# Patient Record
Sex: MALE | Race: BLACK OR AFRICAN AMERICAN | NOT HISPANIC OR LATINO | URBAN - METROPOLITAN AREA
[De-identification: names, ages, dates, MRNs, and addresses within clinical notes are randomized per-mention and may not be internally consistent; named-entity substitution may affect disease eponyms.]

---

## 2020-03-04 ENCOUNTER — EMERGENCY (EMERGENCY)
Facility: HOSPITAL | Age: 25
LOS: 1 days | Discharge: HOME | End: 2020-03-04
Attending: EMERGENCY MEDICINE | Admitting: EMERGENCY MEDICINE
Payer: SELF-PAY

## 2020-03-04 VITALS
WEIGHT: 210.1 LBS | SYSTOLIC BLOOD PRESSURE: 147 MMHG | RESPIRATION RATE: 18 BRPM | DIASTOLIC BLOOD PRESSURE: 77 MMHG | TEMPERATURE: 98 F | HEIGHT: 75 IN | OXYGEN SATURATION: 97 % | HEART RATE: 72 BPM

## 2020-03-04 PROCEDURE — 99284 EMERGENCY DEPT VISIT MOD MDM: CPT

## 2020-03-04 RX ORDER — GENTAMICIN SULFATE 40 MG/ML
240 VIAL (ML) INJECTION ONCE
Refills: 0 | Status: COMPLETED | OUTPATIENT
Start: 2020-03-04 | End: 2020-03-04

## 2020-03-04 RX ORDER — AZITHROMYCIN 500 MG/1
2000 TABLET, FILM COATED ORAL ONCE
Refills: 0 | Status: COMPLETED | OUTPATIENT
Start: 2020-03-04 | End: 2020-03-04

## 2020-03-04 RX ADMIN — Medication 80 MILLIGRAM(S): at 19:47

## 2020-03-04 RX ADMIN — AZITHROMYCIN 2000 MILLIGRAM(S): 500 TABLET, FILM COATED ORAL at 19:47

## 2020-03-04 NOTE — ED PROVIDER NOTE - PHYSICAL EXAMINATION
GEN: Well appearing, well nourished, awake, alert, oriented to person, place, time/situation and in no apparent distress.  HENMT: Airway patent, neck supple. No stridor.  EYES: No pallor or scleral icterus.  CARDIAC: Normal rate, regular rhythm.  Heart sounds S1, S2.  No murmurs, rubs or gallops.   RESP: Breath sounds clear and equal bilaterally. Normal work of breathing. No respiratory distress.   GI: Abdomen soft, non-tender, no rebound or guarding. Bowel sounds present. No CVAT.   : no testicular ttp, uncircumsized, no penile ttp but +thick discharge. No rash, no femoral LAD.   BACK: No midline spinal ttp  MSK: FROM, no obvious deformities, no pedal edema  NEURO: Alert and oriented x3, MAEx4 purposefully, follows commands appropriately, normal gait   PSYCH: Calm and cooperative

## 2020-03-04 NOTE — ED PROVIDER NOTE - NS ED ROS FT
GEN: no f/c, no malaise  HEENT: no nasal congestion, no sore throat   CV: no chest pain, no palpitations  RESP: no cough, no SOB  GI: no abd pain, no nausea, no vomiting, no diarrhea  : no dysuria or frequency, +penile discharge, no rash  MSK: no back pain  NEURO: no headache

## 2020-03-04 NOTE — ED PROVIDER NOTE - OBJECTIVE STATEMENT
24yoM o/w healthy, here c/o penile discharge x 1 week. No dysuria/frequency, no rash, no abd pain, no joint pains. Pt is sexually active with one female partner, no condom use, no known hx of STI in self or partner. Pt does report being treated for a UTI 4 years ago - had penile discharge and dysuria - reports being tested for STIs and it was negative. No other current sexual partners, no other complaints.

## 2020-03-04 NOTE — ED PROVIDER NOTE - PATIENT PORTAL LINK FT
You can access the FollowMyHealth Patient Portal offered by [facility] by registering at the following website: [email or web address]. By joining [facility]’s FollowMyHealth portal, you will also be able to view your health information using other applications (apps) compatible with our system.

## 2020-03-04 NOTE — ED PROVIDER NOTE - CLINICAL SUMMARY MEDICAL DECISION MAKING FREE TEXT BOX
24yoM here with penile discharge x1 week, hx of similar sx in the past and dx'd with UTI. Sexually active with one partner, no condom use. Vitals stable, exam with penile discharge and uncircumsized. Will check for STIs and UA/UCx, however, will presumptively treat for STIs based on sx. Discussed this with pt, verbalized understanding, agrees with plan.

## 2020-03-06 LAB
C TRACH RRNA SPEC QL NAA+PROBE: DETECTED
N GONORRHOEA RRNA SPEC QL NAA+PROBE: SIGNIFICANT CHANGE UP

## 2020-03-10 DIAGNOSIS — R36.9 URETHRAL DISCHARGE, UNSPECIFIED: ICD-10-CM
